# Patient Record
Sex: MALE | Race: WHITE | NOT HISPANIC OR LATINO
[De-identification: names, ages, dates, MRNs, and addresses within clinical notes are randomized per-mention and may not be internally consistent; named-entity substitution may affect disease eponyms.]

---

## 2020-03-31 ENCOUNTER — TRANSCRIPTION ENCOUNTER (OUTPATIENT)
Age: 40
End: 2020-03-31

## 2021-06-17 ENCOUNTER — TRANSCRIPTION ENCOUNTER (OUTPATIENT)
Age: 41
End: 2021-06-17

## 2021-08-31 ENCOUNTER — APPOINTMENT (OUTPATIENT)
Dept: FAMILY MEDICINE | Facility: CLINIC | Age: 41
End: 2021-08-31

## 2022-02-06 ENCOUNTER — NON-APPOINTMENT (OUTPATIENT)
Age: 42
End: 2022-02-06

## 2022-02-07 ENCOUNTER — APPOINTMENT (OUTPATIENT)
Dept: PHYSICAL MEDICINE AND REHAB | Facility: CLINIC | Age: 42
End: 2022-02-07
Payer: COMMERCIAL

## 2022-02-07 VITALS
HEART RATE: 98 BPM | RESPIRATION RATE: 18 BRPM | DIASTOLIC BLOOD PRESSURE: 77 MMHG | TEMPERATURE: 97.6 F | SYSTOLIC BLOOD PRESSURE: 127 MMHG | HEIGHT: 72 IN | OXYGEN SATURATION: 95 %

## 2022-02-07 DIAGNOSIS — Z87.820 PERSONAL HISTORY OF TRAUMATIC BRAIN INJURY: ICD-10-CM

## 2022-02-07 DIAGNOSIS — S06.0X0A CONCUSSION W/OUT LOSS OF CONSCIOUSNESS, INITIAL ENCOUNTER: ICD-10-CM

## 2022-02-07 DIAGNOSIS — Z86.59 PERSONAL HISTORY OF OTHER MENTAL AND BEHAVIORAL DISORDERS: ICD-10-CM

## 2022-02-07 PROCEDURE — 99213 OFFICE O/P EST LOW 20 MIN: CPT
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
## 2022-02-07 NOTE — PHYSICAL EXAM
[Person] : Oriented to self [Fluency Intact] : Fluent [Cranial Nerves Trigeminal (V)] : Facial sensation is symmetric [Cranial Nerves Facial (VII)] : Facial strength is symmetric [Cranial Nerves Accessory (XI - Cranial And Spinal)] : Head turning and shoulder shrug symmetric [Cranial Nerves Oculomotor (III)] : Extraocular motion is intact [Saccades] : No saccades [Symptoms with Head Turns on Fixed Point] : No symptoms present with head turns on a fixed point [Nystagmus] : No nystagmus [Symptoms with Head Turns and Ambulation] : No symptoms present with head turns and ambulation [Full Cervical ROM] : Full cervical ROM is present [Cervical Tenderness] : Cervical tenderness is present [Normal] : Patient has a normal gait. [Single Stance] : Single stance is normal [Tandem Stance] : Tandem stance is normal [de-identified] : MOCA sore 25/30-  deficits in visuospatial/attention tasks
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
## 2022-02-14 ENCOUNTER — APPOINTMENT (OUTPATIENT)
Dept: CT IMAGING | Facility: CLINIC | Age: 42
End: 2022-02-14

## 2022-02-28 ENCOUNTER — APPOINTMENT (OUTPATIENT)
Dept: PHYSICAL MEDICINE AND REHAB | Facility: CLINIC | Age: 42
End: 2022-02-28

## 2024-02-16 ENCOUNTER — APPOINTMENT (OUTPATIENT)
Dept: ORTHOPEDIC SURGERY | Facility: CLINIC | Age: 44
End: 2024-02-16
Payer: COMMERCIAL

## 2024-02-16 VITALS — WEIGHT: 171 LBS | HEIGHT: 72 IN | BODY MASS INDEX: 23.16 KG/M2

## 2024-02-16 PROCEDURE — ZZZZZ: CPT

## 2024-02-16 RX ORDER — METHYLPREDNISOLONE 4 MG/1
4 TABLET ORAL
Qty: 1 | Refills: 0 | Status: ACTIVE | COMMUNITY
Start: 2024-02-16 | End: 1900-01-01

## 2024-02-18 NOTE — HISTORY OF PRESENT ILLNESS
[de-identified] : 02/16/2024 - 42 y/o M presenting for evaluation of low back pain radiating into rt buttock/leg, last seen January 2022. Last week states a "pop" sensation in his lower back with immediate onset of right leg pain x 1.5 week ago w/o trauma. No bowel/bladder dysfunction.   The patient is a 43 year male who presents today complaining of low back pain radiating into right buttocks and leg Date of Injury/Onset:  Pain:    At Rest: 9/10  With Activity:  9/10  Mechanism of injury: no specific injury Quality of symptoms: sharp radiating burning pain, constant Improves with: advil Worse with: activity Prior treatment: n/a Prior Imaging: n/a Additional Information: None

## 2024-02-18 NOTE — PHYSICAL EXAM
[3___] : right dorsiflexors 3[unfilled]/5 [5___] : right extensor hallicus longus 5[unfilled]/5 [Normal Coordination] : normal coordination [Normal DTR UE/LE] : normal DTR UE/LE  [Normal Sensation] : normal sensation [Normal Mood and Affect] : normal mood and affect [Oriented] : oriented [Normal Skin] : normal skin [Able to Communicate] : able to communicate [No Rash] : no rash [No Ulcers] : no ulcers [No Lesions] : no lesions [No obvious lymphadenopathy in areas examined] : no obvious lymphadenopathy in areas examined [Well Developed] : well developed [Peripheral vascular exam is grossly normal] : peripheral vascular exam is grossly normal [No Respiratory Distress] : no respiratory distress [de-identified] : Constitutional: - General Appearance: Unremarkable Body Habitus Well Developed Well Nourished Body Habitus No Deformities Well Groomed Ability To communicate: Normal Neurologic: Global sensation is intact to upper and lower extremities. See examination of Neck and/or Spine for exceptions. Orientation to Time, Place and Person is: Normal Mood And Affect is Normal Skin: - Head/Face, Right Upper/Lower Extremity, Left Upper/Lower Extremity: Normal See Examination of Neck and/or Spine for exceptions Cardiovascular: Peripheral Cardiovascular System is Normal Palpation of Lymph Nodes: Normal Palpation of lymph nodes in: Axilla, Cervical, Inguinal Abnormal Palpation of lymph nodes in: None  [] : negative straight leg raise

## 2024-02-18 NOTE — DISCUSSION/SUMMARY
[de-identified] : 42 y/o M with acute lumbar radiculopathy. I am prescribing the patient MDP for pain relief. Titration schedule provided. I am requesting a lumbar spine MRI, last study is > 1 year old, patient has acute RLE radic and weakness (right dorsiflexors 3/5). Patient was provided with a referral for lumbar and lower extremity physical therapy to work on stretching, strengthening and range of motion. Follow up after MRI.   Prior to appointment and during encounter with patient extensive medical records were reviewed including but not limited to, hospital records, outpatient records, imaging results, and lab data.During this appointment the patient was examined, diagnoses were discussed and explained in a face to face manner. In addition extensive time was spent reviewing aforementioned diagnostic studies. Counseling including abnormal image results, differential diagnoses, treatment options, risk and benefits, lifestyle changes, current condition, and current medications was performed. Patient's comments, questions, and concerns were addressed and patient verbalized understanding. Based on this patient's presentation at our office, which is an orthopedic spine surgeon's office, this patient inherently / intrinsically has a risk, however minute, of developing issues such as Cauda equina syndrome, bowel and bladder changes, or progression of motor or neurological deficits such as paralysis which may be permanent.  MIRA GRAJEDA Acting as a Scribe for Dr. Cirilo JI, Mira Grajeda, attest that this documentation has been prepared under the direction and in the presence of Provider Shai Kerr MD.

## 2024-02-24 ENCOUNTER — RESULT REVIEW (OUTPATIENT)
Age: 44
End: 2024-02-24

## 2024-03-01 ENCOUNTER — APPOINTMENT (OUTPATIENT)
Dept: ORTHOPEDIC SURGERY | Facility: CLINIC | Age: 44
End: 2024-03-01
Payer: COMMERCIAL

## 2024-03-01 VITALS — BODY MASS INDEX: 23.16 KG/M2 | HEIGHT: 72 IN | WEIGHT: 171 LBS

## 2024-03-01 DIAGNOSIS — Z78.9 OTHER SPECIFIED HEALTH STATUS: ICD-10-CM

## 2024-03-01 DIAGNOSIS — M51.26 OTHER INTERVERTEBRAL DISC DISPLACEMENT, LUMBAR REGION: ICD-10-CM

## 2024-03-01 DIAGNOSIS — M54.16 RADICULOPATHY, LUMBAR REGION: ICD-10-CM

## 2024-03-01 PROCEDURE — ZZZZZ: CPT

## 2024-03-01 RX ORDER — CARISOPRODOL 350 MG/1
350 TABLET ORAL
Qty: 30 | Refills: 1 | Status: ACTIVE | COMMUNITY
Start: 2024-03-01 | End: 1900-01-01

## 2024-03-01 NOTE — DISCUSSION/SUMMARY
[de-identified] : 42 y/o M with acute lumbar radiculopathy stemming from large right sided disc herniation L5S1. Patient was provided with a referral for lumbar and lower extremity physical therapy to work on stretching, strengthening and range of motion. I am referring the patient to pain management to discuss trying LESI for pain relief. Continuation of Tylenol, Aleve, and Gabapentin for symptom control, prescribed methocarbamol. Will remain conservative at this time. More urgency for surgery (rt microdiscectomy L5S1) if there is progression of motor or neurological deficits. FUV 6 WKS.   Prior to appointment and during encounter with patient extensive medical records were reviewed including but not limited to, hospital records, outpatient records, imaging results, and lab data.During this appointment the patient was examined, diagnoses were discussed and explained in a face to face manner. In addition extensive time was spent reviewing aforementioned diagnostic studies. Counseling including abnormal image results, differential diagnoses, treatment options, risk and benefits, lifestyle changes, current condition, and current medications was performed. Patient's comments, questions, and concerns were addressed and patient verbalized understanding. Based on this patient's presentation at our office, which is an orthopedic spine surgeon's office, this patient inherently / intrinsically has a risk, however minute, of developing issues such as Cauda equina syndrome, bowel and bladder changes, or progression of motor or neurological deficits such as paralysis which may be permanent.  MIRA GRAJEDA Acting as a Scribe for Dr. Cirilo JI, Mira Grajeda, attest that this documentation has been prepared under the direction and in the presence of Provider Shai Kerr MD.

## 2024-03-01 NOTE — PHYSICAL EXAM
[Normal Coordination] : normal coordination [Normal Sensation] : normal sensation [Normal DTR UE/LE] : normal DTR UE/LE  [Normal Mood and Affect] : normal mood and affect [Oriented] : oriented [Able to Communicate] : able to communicate [Normal Skin] : normal skin [No Rash] : no rash [No Ulcers] : no ulcers [No Lesions] : no lesions [No obvious lymphadenopathy in areas examined] : no obvious lymphadenopathy in areas examined [Well Developed] : well developed [Peripheral vascular exam is grossly normal] : peripheral vascular exam is grossly normal [No Respiratory Distress] : no respiratory distress [3___] : right dorsiflexors 3[unfilled]/5 [5___] : right extensor hallicus longus 5[unfilled]/5 [de-identified] : Constitutional: - General Appearance: Unremarkable Body Habitus Well Developed Well Nourished Body Habitus No Deformities Well Groomed Ability To communicate: Normal Neurologic: Global sensation is intact to upper and lower extremities. See examination of Neck and/or Spine for exceptions. Orientation to Time, Place and Person is: Normal Mood And Affect is Normal Skin: - Head/Face, Right Upper/Lower Extremity, Left Upper/Lower Extremity: Normal See Examination of Neck and/or Spine for exceptions Cardiovascular: Peripheral Cardiovascular System is Normal Palpation of Lymph Nodes: Normal Palpation of lymph nodes in: Axilla, Cervical, Inguinal Abnormal Palpation of lymph nodes in: None  [] : negative straight leg raise

## 2024-03-01 NOTE — HISTORY OF PRESENT ILLNESS
[de-identified] : 03/01/2024 - Pt presents to review MRI, c/o rt buttock pain radiating throughout the right leg. No changes since he was last seen. He is taking tylenol and aleve. Taking Gabapentin once per day, cannot up titrate due to fatigue.   02/16/2024 - 42 y/o M presenting for evaluation of low back pain radiating into rt buttock/leg, last seen January 2022. Last week states a "pop" sensation in his lower back with immediate onset of right leg pain x 1.5 week ago w/o trauma. No bowel/bladder dysfunction.   The patient is a 43 year male who presents today complaining of low back pain radiating into right buttocks and leg Date of Injury/Onset:  Pain:    At Rest: 9/10  With Activity:  9/10  Mechanism of injury: no specific injury Quality of symptoms: sharp radiating burning pain, constant Improves with: advil Worse with: activity Prior treatment: n/a Prior Imaging: n/a Additional Information: None

## 2024-04-12 ENCOUNTER — APPOINTMENT (OUTPATIENT)
Dept: ORTHOPEDIC SURGERY | Facility: CLINIC | Age: 44
End: 2024-04-12

## 2024-11-13 ENCOUNTER — RX RENEWAL (OUTPATIENT)
Age: 44
End: 2024-11-13